# Patient Record
Sex: FEMALE | Race: WHITE | HISPANIC OR LATINO | Employment: STUDENT | URBAN - METROPOLITAN AREA
[De-identification: names, ages, dates, MRNs, and addresses within clinical notes are randomized per-mention and may not be internally consistent; named-entity substitution may affect disease eponyms.]

---
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
## 2024-01-10 NOTE — TELEPHONE ENCOUNTER
----- Message from Carmelina Tafoya MD sent at 2/2/2022  9:08 AM CST -----  Overbook tomorrow at 10 am  ----- Message -----  From: Amaya Espinoza LPN  Sent: 2/2/2022   8:29 AM CST  To: Carmelina Tafoya MD    Please review and advise if able to schedule this patient .. she is 16. So will have to see you...    R  ----- Message -----  From: Kristi Harmon MA  Sent: 2/2/2022   8:28 AM CST  To: Michelet Cosme Staff (Neuro)    Type: Needs Medical Advice  Who Called:  Cara Martin    Best Call Back Number: [phone]  Additional Information: pt was referred to us by Dr Mcmillan with [facility] for chronic daily headaches--would like to know if Dr Tafoya can take her on as a pt since she is under the age of 17? Please advise--thank you             Yes

## 2024-02-05 ENCOUNTER — PATIENT MESSAGE (OUTPATIENT)
Dept: NEUROLOGY | Facility: CLINIC | Age: 19
End: 2024-02-05
Payer: COMMERCIAL

## 2024-02-05 NOTE — LETTER
February 5, 2024    Love Wells  [address] [address]             [place] - Headache  [address] [place] [address]  Phone: [phone]  Fax: [phone] To Whom It May Concern,     Love is under my care, since February 2022, for the treatment of episodic migraines. She will have periodic debilitating episodes of migraines that may require medication that could decrease her mental acuity .     Please feel free to contact my office with any questions or concerns.       Thank you,       Carmelina Tafoya MD

## 2024-02-05 NOTE — LETTER
February 6, 2024    Love Wells  [address] [address]             [place] - Headache  [address] [place] [address]  Phone: [phone]  Fax: [phone] To Whom It May Concern,      Love is under my care, since February 2022, for the treatment of episodic migraines. She will have periodic debilitating episodes of migraines that may require medication that could decrease her ability to function normally.      Please feel free to contact my office with any questions or concerns.         Thank you,         Carmelina Tafoya MD

## 2024-04-12 ENCOUNTER — OFFICE VISIT (OUTPATIENT)
Dept: PEDIATRICS | Facility: CLINIC | Age: 19
End: 2024-04-12
Payer: COMMERCIAL

## 2024-04-12 VITALS
SYSTOLIC BLOOD PRESSURE: 124 MMHG | HEART RATE: 76 BPM | WEIGHT: 201.5 LBS | BODY MASS INDEX: 37.08 KG/M2 | RESPIRATION RATE: 20 BRPM | TEMPERATURE: 99 F | DIASTOLIC BLOOD PRESSURE: 83 MMHG

## 2024-04-12 DIAGNOSIS — J06.9 URI, ACUTE: Primary | ICD-10-CM

## 2024-04-12 PROCEDURE — 3079F DIAST BP 80-89 MM HG: CPT | Mod: CPTII,S$GLB,, | Performed by: PEDIATRICS

## 2024-04-12 PROCEDURE — 1159F MED LIST DOCD IN RCRD: CPT | Mod: CPTII,S$GLB,, | Performed by: PEDIATRICS

## 2024-04-12 PROCEDURE — 3074F SYST BP LT 130 MM HG: CPT | Mod: CPTII,S$GLB,, | Performed by: PEDIATRICS

## 2024-04-12 PROCEDURE — 99999 PR PBB SHADOW E&M-EST. PATIENT-LVL III: CPT | Mod: PBBFAC,,, | Performed by: PEDIATRICS

## 2024-04-12 PROCEDURE — 3008F BODY MASS INDEX DOCD: CPT | Mod: CPTII,S$GLB,, | Performed by: PEDIATRICS

## 2024-04-12 PROCEDURE — 1160F RVW MEDS BY RX/DR IN RCRD: CPT | Mod: CPTII,S$GLB,, | Performed by: PEDIATRICS

## 2024-04-12 PROCEDURE — 99213 OFFICE O/P EST LOW 20 MIN: CPT | Mod: S$GLB,,, | Performed by: PEDIATRICS

## 2024-04-12 RX ORDER — BROMPHENIRAMINE MALEATE, PSEUDOEPHEDRINE HYDROCHLORIDE, AND DEXTROMETHORPHAN HYDROBROMIDE 2; 30; 10 MG/5ML; MG/5ML; MG/5ML
10 SYRUP ORAL EVERY 8 HOURS PRN
Qty: 240 ML | Refills: 0 | Status: SHIPPED | OUTPATIENT
Start: 2024-04-12 | End: 2024-04-17

## 2024-04-12 NOTE — PROGRESS NOTES
Subjective     Love Wells is a 18 y.o. female here with mother. Patient brought in for Nasal Congestion (The pt has nasal congestion for a few days now. It started on Tuesday.)      History of Present Illness:  Cough  This is a new problem. The current episode started in the past 7 days (4 days ago). The problem has been unchanged. The problem occurs constantly. The cough is Non-productive. Associated symptoms include nasal congestion, rhinorrhea and a sore throat. Pertinent negatives include no chest pain, chills, ear congestion, ear pain, eye redness, fever, headaches, postnasal drip, rash, shortness of breath or wheezing. Nothing aggravates the symptoms. She has tried OTC cough suppressant for the symptoms. The treatment provided mild relief. There is no history of bronchitis.       Review of Systems   Constitutional:  Negative for activity change, appetite change, chills, fatigue and fever.   HENT:  Positive for rhinorrhea and sore throat. Negative for congestion, ear discharge, ear pain, nosebleeds, postnasal drip, sinus pressure and sneezing.    Eyes:  Negative for pain, discharge, redness and itching.   Respiratory:  Positive for cough. Negative for shortness of breath and wheezing.    Cardiovascular:  Negative for chest pain and palpitations.   Gastrointestinal:  Negative for abdominal pain, constipation, diarrhea and vomiting.   Genitourinary:  Negative for dysuria, enuresis, hematuria and urgency.   Musculoskeletal:  Negative for neck stiffness.   Skin:  Negative for rash.   Neurological:  Negative for syncope and headaches.          Objective     Physical Exam  Vitals and nursing note reviewed. Exam conducted with a chaperone present.   Constitutional:       Appearance: She is well-developed and normal weight.   HENT:      Head: Normocephalic and atraumatic.      Right Ear: Tympanic membrane, ear canal and external ear normal.      Left Ear: Tympanic membrane, ear canal and external ear normal.       Nose: Mucosal edema and rhinorrhea (clear rhinorrhea) present.      Mouth/Throat:      Pharynx: Posterior oropharyngeal erythema (mild) present. No oropharyngeal exudate.   Eyes:      Conjunctiva/sclera: Conjunctivae normal.      Pupils: Pupils are equal, round, and reactive to light.   Cardiovascular:      Rate and Rhythm: Normal rate and regular rhythm.      Heart sounds: Normal heart sounds. No murmur heard.  Pulmonary:      Effort: Pulmonary effort is normal. No respiratory distress.      Breath sounds: Normal breath sounds.   Abdominal:      General: Bowel sounds are normal. There is no distension.      Palpations: There is no mass.      Tenderness: There is no abdominal tenderness.   Musculoskeletal:      Cervical back: Normal range of motion and neck supple.   Skin:     General: Skin is warm.      Findings: No rash.   Neurological:      Mental Status: She is alert and oriented to person, place, and time.            Assessment and Plan     1. URI, acute        Plan:    Love was seen today for nasal congestion.    Diagnoses and all orders for this visit:    URI, acute    Other orders  -     brompheniramine-pseudoeph-DM (BROMFED DM) 2-30-10 mg/5 mL Syrp; Take 10 mLs by mouth every 8 (eight) hours as needed (cough/congestion).      1.  Nasal saline spray as needed  for congestion.  2.  Encourage frequent oral fluids.  3.  Return to clinic if lethargy, breathing difficulty, worsening headache/pain, signs of dehydration or if any other acute concerns, but if after hours, call the service or seek evaluation at the Emergency Room.  4.  Return to clinic or call if continued symptoms for 5 days.

## 2024-05-13 ENCOUNTER — OFFICE VISIT (OUTPATIENT)
Dept: NEUROLOGY | Facility: CLINIC | Age: 19
End: 2024-05-13
Payer: COMMERCIAL

## 2024-05-13 VITALS
SYSTOLIC BLOOD PRESSURE: 124 MMHG | RESPIRATION RATE: 17 BRPM | HEIGHT: 62 IN | TEMPERATURE: 98 F | DIASTOLIC BLOOD PRESSURE: 80 MMHG | WEIGHT: 204.13 LBS | HEART RATE: 76 BPM | BODY MASS INDEX: 37.56 KG/M2

## 2024-05-13 DIAGNOSIS — Q79.8 CONNECTIVE TISSUE ANOMALY: ICD-10-CM

## 2024-05-13 DIAGNOSIS — G43.019 INTRACTABLE MIGRAINE WITHOUT AURA AND WITHOUT STATUS MIGRAINOSUS: Primary | ICD-10-CM

## 2024-05-13 PROCEDURE — 1159F MED LIST DOCD IN RCRD: CPT | Mod: CPTII,S$GLB,, | Performed by: PSYCHIATRY & NEUROLOGY

## 2024-05-13 PROCEDURE — 3079F DIAST BP 80-89 MM HG: CPT | Mod: CPTII,S$GLB,, | Performed by: PSYCHIATRY & NEUROLOGY

## 2024-05-13 PROCEDURE — 3008F BODY MASS INDEX DOCD: CPT | Mod: CPTII,S$GLB,, | Performed by: PSYCHIATRY & NEUROLOGY

## 2024-05-13 PROCEDURE — 99999 PR PBB SHADOW E&M-EST. PATIENT-LVL III: CPT | Mod: PBBFAC,,, | Performed by: PSYCHIATRY & NEUROLOGY

## 2024-05-13 PROCEDURE — 3074F SYST BP LT 130 MM HG: CPT | Mod: CPTII,S$GLB,, | Performed by: PSYCHIATRY & NEUROLOGY

## 2024-05-13 PROCEDURE — 99214 OFFICE O/P EST MOD 30 MIN: CPT | Mod: S$GLB,,, | Performed by: PSYCHIATRY & NEUROLOGY

## 2024-05-13 NOTE — PROGRESS NOTES
Subjective:       Patient ID: Love Wells is a 18 y.o. female.    Chief Complaint: Headache  INTERVAL HISTORY 05/13/2024  Love presents for follow up. She is doing well. She reports 3 to 4 headaches per month, she is pain free with medication in about 30 minutes. Very pleased with current protocol including Qulipta for prevention and Axert for acute attacks. No change in habitual headche pattern. No red flags.      HPI  Love is a 18 year old female here for follow up regarding migraines which were diagnosed 2 years ago. Since her diagnosis she has been placed on quilepta which she takes everyday and Tylenol as needed. Over the past two weeks she has had three headaches which she attirbutes to exam induced stress. Overall, however she has been doing well. She states that during the duration of her semester she has had only three episodes of severe headaches. She describes the headaches as pounding, near her right eye. She does endorse photophobia and phonophobia for the duration of her headaches, and reports dizziness prior to the onset. She has no other concerns and states that even when the headaches come on, they are not too severe, usually 5/10 and are relieved with Tylenol within 30 minutes. Over the past month she has not taken Tylenol for her migraine but does have a hypermobile right shoulder for which she does have to take Tylenol for pain relief at least once a month       Quilepta (everyday) --> 1 headache a week sometiems 0  Tylenol an hour too --> twice a month    Review of Systems   Constitutional:  Negative for appetite change, chills, diaphoresis and fatigue.   HENT:  Positive for congestion.    Eyes:  Negative for discharge and itching.   Respiratory:  Negative for chest tightness and shortness of breath.    Cardiovascular:  Negative for chest pain, palpitations and leg swelling.   Endocrine: Negative for polyuria.   Genitourinary:  Negative for decreased urine volume, difficulty  urinating, dysuria and vaginal bleeding.   Musculoskeletal:  Positive for arthralgias. Negative for back pain and gait problem.   Skin:  Negative for color change.   Allergic/Immunologic: Positive for environmental allergies.   Neurological:  Positive for dizziness and headaches.             Past Medical History:   Diagnosis Date    Headache      History reviewed. No pertinent surgical history.  Family History   Problem Relation Name Age of Onset    Allergies Mother      Allergic rhinitis Mother      Diabetes Father      Hypertension Father      No Known Problems Sister      Heart disease Maternal Grandfather      No Known Problems Sister      No Known Problems Sister      Angioedema Neg Hx      Asthma Neg Hx      Eczema Neg Hx      Immunodeficiency Neg Hx      Rhinitis Neg Hx      Urticaria Neg Hx       Social History     Socioeconomic History    Marital status: Single   Tobacco Use    Smoking status: Never    Smokeless tobacco: Never     Social Determinants of Health     Financial Resource Strain: Low Risk  (5/13/2024)    Overall Financial Resource Strain (CARDIA)     Difficulty of Paying Living Expenses: Not hard at all   Food Insecurity: No Food Insecurity (5/13/2024)    Hunger Vital Sign     Worried About Running Out of Food in the Last Year: Never true     Ran Out of Food in the Last Year: Never true   Transportation Needs: No Transportation Needs (5/13/2024)    PRAPARE - Transportation     Lack of Transportation (Medical): No     Lack of Transportation (Non-Medical): No   Physical Activity: Insufficiently Active (5/13/2024)    Exercise Vital Sign     Days of Exercise per Week: 2 days     Minutes of Exercise per Session: 20 min   Stress: Stress Concern Present (5/13/2024)    [employer] - Occupational Stress Questionnaire     Feeling of Stress : To some extent   Housing Stability: Unknown (5/13/2024)    Housing Stability Vital Sign     Unable to Pay for Housing in the Last Year: No      Review of patient's allergies indicates:   Allergen Reactions    Imitrex [sumatriptan] Swelling     Throat swelling per pt     Current Outpatient Medications   Medication Sig    acetaminophen (TYLENOL) 500 MG tablet Take 500 mg by mouth every 6 (six) hours as needed for Pain.    atogepant (QULIPTA) 30 mg Tab Take 30 mg by mouth once daily.    erenumab-aooe (AIMOVIG AUTOINJECTOR) 70 mg/mL autoinjector Inject 1 mL (70 mg total) into the skin every 28 days.    olopatadine (PATADAY) 0.2 % Drop Place 1 drop into both eyes once daily. 90 day supply    almotriptan (AXERT) 12.5 MG tablet Take 1 tablet (12.5 mg total) by mouth as needed for Migraine. may repeat in 2 hours if needed     No current facility-administered medications for this visit.         Objective:      Vitals:    05/13/24 1032   BP: 124/80   Pulse: 76   Resp: 17   Temp: 97.8 °F (36.6 °C)     Body mass index is 37.57 kg/m².    Neurological Exam:  General: well-developed, well-nourished, no distress  Mental status: Awake and alert  Speech language: No dysarthria or aphasia on conversation  Cranial nerves: Face symmetric  Motor: Moves all extremities well  Coordination: No ataxia. No tremor.     Review of Data:  Lab Results   Component Value Date     06/15/2022    K 4.5 06/15/2022     06/15/2022    CO2 24 06/15/2022    BUN 14 06/15/2022    CREATININE 0.8 06/15/2022    GLU 80 06/15/2022    AST 16 06/15/2022    ALT 18 06/15/2022    ALBUMIN 4.2 06/15/2022    PROT 7.9 06/15/2022    BILITOT 0.3 06/15/2022    CHOL 166 06/15/2022    HDL 46 06/15/2022    LDLCALC 96.2 06/15/2022    TRIG 119 06/15/2022       Lab Results   Component Value Date    WBC 6.14 06/07/2023    HGB 12.8 06/07/2023    HCT 41.7 06/07/2023    MCV 80 (L) 06/07/2023     06/07/2023       Lab Results   Component Value Date    TSH 3.452 06/15/2022     No results found for this or any previous visit.            Assessment and Plan   There are no diagnoses linked to this  encounter.  Migraines  Continue current treatment regimen with Quilipta and Tylenol PRN; Not needed to take Axert  Continue to monitor any adverse effects and/or headache progression  Follow up in 11 months

## 2024-05-22 ENCOUNTER — PATIENT MESSAGE (OUTPATIENT)
Dept: INTERNAL MEDICINE | Facility: CLINIC | Age: 19
End: 2024-05-22
Payer: COMMERCIAL

## 2024-06-14 ENCOUNTER — OFFICE VISIT (OUTPATIENT)
Dept: PEDIATRICS | Facility: CLINIC | Age: 19
End: 2024-06-14
Payer: COMMERCIAL

## 2024-06-14 VITALS
RESPIRATION RATE: 20 BRPM | WEIGHT: 207 LBS | TEMPERATURE: 98 F | SYSTOLIC BLOOD PRESSURE: 122 MMHG | HEIGHT: 62 IN | BODY MASS INDEX: 38.09 KG/M2 | DIASTOLIC BLOOD PRESSURE: 80 MMHG | HEART RATE: 72 BPM

## 2024-06-14 DIAGNOSIS — Z00.00 ENCOUNTER FOR WELL ADULT EXAM WITHOUT ABNORMAL FINDINGS: Primary | ICD-10-CM

## 2024-06-14 PROCEDURE — 99999 PR PBB SHADOW E&M-EST. PATIENT-LVL IV: CPT | Mod: PBBFAC,,, | Performed by: PEDIATRICS

## 2024-06-14 NOTE — PATIENT INSTRUCTIONS
If you have an active [email or web address] account, please look for your well child questionnaire to come to your [email or web address] account before your next well child visit.  Children younger than 13 must be in the rear seat of a vehicle when available and properly restrained.

## 2024-06-14 NOTE — PROGRESS NOTES
Subjective     Love Wells is a 18 y.o. female here with mother. Patient brought in for Well Child (18 years )      History of Present Illness:  SUBJECTIVE:   Love Wells is a 18 y.o. female presenting for well adolescent and school/sports physical. She is seen today accompanied by mother.  In college at [employer].    PMH: No asthma, diabetes, heart disease, epilepsy or orthopedic problems in the past.        Review of Systems   Constitutional:  Negative for activity change, appetite change, fever and unexpected weight change.   HENT:  Negative for congestion, dental problem, ear pain, hearing loss, rhinorrhea, sinus pressure, sneezing and sore throat.    Eyes:  Negative for redness, itching and visual disturbance.   Respiratory:  Negative for cough, shortness of breath and wheezing.    Cardiovascular:  Negative for chest pain and palpitations.   Gastrointestinal:  Negative for abdominal pain, diarrhea, nausea and vomiting.   Genitourinary:  Negative for dysuria, frequency, hematuria, menstrual problem, vaginal bleeding, vaginal discharge and vaginal pain.   Musculoskeletal:  Negative for arthralgias and myalgias.   Skin:  Negative for rash.   Neurological:  Negative for dizziness, syncope, light-headedness and headaches.   Hematological:  Negative for adenopathy. Does not bruise/bleed easily.   Psychiatric/Behavioral:  Negative for behavioral problems and sleep disturbance. The patient is not nervous/anxious.           Objective     Physical Exam  Vitals and nursing note reviewed. Exam conducted with a chaperone present.   Constitutional:       General: She is not in acute distress.     Appearance: Normal appearance. She is well-developed. She is obese.   HENT:      Head: Normocephalic and atraumatic.      Right Ear: Tympanic membrane and external ear normal.      Left Ear: Tympanic membrane and external ear normal.      Nose: Nose normal.      Mouth/Throat:      Mouth: Mucous membranes are  "moist.      Pharynx: No oropharyngeal exudate.   Eyes:      Conjunctiva/sclera: Conjunctivae normal.      Pupils: Pupils are equal, round, and reactive to light.   Cardiovascular:      Rate and Rhythm: Normal rate and regular rhythm.      Heart sounds: Normal heart sounds. No murmur heard.  Pulmonary:      Effort: Pulmonary effort is normal. No respiratory distress.      Breath sounds: No wheezing.   Abdominal:      General: Bowel sounds are normal. There is no distension.      Palpations: Abdomen is soft. There is no mass.      Tenderness: There is no abdominal tenderness. There is no guarding or rebound.   Musculoskeletal:         General: Normal range of motion.      Cervical back: Normal range of motion and neck supple.   Lymphadenopathy:      Cervical: No cervical adenopathy.   Skin:     General: Skin is warm and dry.      Capillary Refill: Capillary refill takes less than 2 seconds.   Neurological:      Mental Status: She is alert and oriented to person, place, and time.      Deep Tendon Reflexes: Reflexes are normal and symmetric.            Assessment and Plan     1. Encounter for well adult exam without abnormal findings    2. BMI 38.0-38.9,adult        Plan:    Love Wells" was seen today for well child.    Diagnoses and all orders for this visit:    Encounter for well adult exam without abnormal findings    BMI 38.0-38.9,adult      Age appropriate physical activity and nutritional counseling were completed during today's visit.         "

## 2024-07-29 ENCOUNTER — PATIENT MESSAGE (OUTPATIENT)
Dept: INTERNAL MEDICINE | Facility: CLINIC | Age: 19
End: 2024-07-29
Payer: COMMERCIAL

## 2024-10-10 ENCOUNTER — OFFICE VISIT (OUTPATIENT)
Dept: PEDIATRICS | Facility: CLINIC | Age: 19
End: 2024-10-10
Payer: COMMERCIAL

## 2024-10-10 ENCOUNTER — HOSPITAL ENCOUNTER (OUTPATIENT)
Dept: RADIOLOGY | Facility: HOSPITAL | Age: 19
Discharge: HOME | End: 2024-10-10
Attending: PEDIATRICS
Payer: COMMERCIAL

## 2024-10-10 ENCOUNTER — PATIENT MESSAGE (OUTPATIENT)
Dept: PEDIATRICS | Facility: CLINIC | Age: 19
End: 2024-10-10

## 2024-10-10 VITALS
TEMPERATURE: 99 F | BODY MASS INDEX: 37.2 KG/M2 | RESPIRATION RATE: 20 BRPM | DIASTOLIC BLOOD PRESSURE: 73 MMHG | SYSTOLIC BLOOD PRESSURE: 109 MMHG | HEART RATE: 79 BPM | WEIGHT: 200.81 LBS

## 2024-10-10 DIAGNOSIS — M54.2 NECK PAIN ON LEFT SIDE: ICD-10-CM

## 2024-10-10 DIAGNOSIS — M25.512 ACUTE PAIN OF LEFT SHOULDER: Primary | ICD-10-CM

## 2024-10-10 DIAGNOSIS — M25.512 ACUTE PAIN OF LEFT SHOULDER: ICD-10-CM

## 2024-10-10 PROCEDURE — 72040 X-RAY EXAM NECK SPINE 2-3 VW: CPT | Mod: 26,,, | Performed by: RADIOLOGY

## 2024-10-10 PROCEDURE — G2211 COMPLEX E/M VISIT ADD ON: HCPCS | Mod: S$GLB,,, | Performed by: PEDIATRICS

## 2024-10-10 PROCEDURE — 1159F MED LIST DOCD IN RCRD: CPT | Mod: CPTII,S$GLB,, | Performed by: PEDIATRICS

## 2024-10-10 PROCEDURE — 99999 PR PBB SHADOW E&M-EST. PATIENT-LVL III: CPT | Mod: PBBFAC,,, | Performed by: PEDIATRICS

## 2024-10-10 PROCEDURE — 3008F BODY MASS INDEX DOCD: CPT | Mod: CPTII,S$GLB,, | Performed by: PEDIATRICS

## 2024-10-10 PROCEDURE — 72070 X-RAY EXAM THORAC SPINE 2VWS: CPT | Mod: TC,PN

## 2024-10-10 PROCEDURE — 3074F SYST BP LT 130 MM HG: CPT | Mod: CPTII,S$GLB,, | Performed by: PEDIATRICS

## 2024-10-10 PROCEDURE — 72070 X-RAY EXAM THORAC SPINE 2VWS: CPT | Mod: 26,,, | Performed by: RADIOLOGY

## 2024-10-10 PROCEDURE — 1160F RVW MEDS BY RX/DR IN RCRD: CPT | Mod: CPTII,S$GLB,, | Performed by: PEDIATRICS

## 2024-10-10 PROCEDURE — 99214 OFFICE O/P EST MOD 30 MIN: CPT | Mod: S$GLB,,, | Performed by: PEDIATRICS

## 2024-10-10 PROCEDURE — 72040 X-RAY EXAM NECK SPINE 2-3 VW: CPT | Mod: TC,PN

## 2024-10-10 PROCEDURE — 3078F DIAST BP <80 MM HG: CPT | Mod: CPTII,S$GLB,, | Performed by: PEDIATRICS

## 2024-10-10 RX ORDER — NAPROXEN 500 MG/1
500 TABLET ORAL 2 TIMES DAILY PRN
Qty: 20 TABLET | Refills: 0 | Status: SHIPPED | OUTPATIENT
Start: 2024-10-10

## 2024-10-10 RX ORDER — CYCLOBENZAPRINE HCL 5 MG
5 TABLET ORAL 3 TIMES DAILY PRN
Qty: 20 TABLET | Refills: 0 | Status: SHIPPED | OUTPATIENT
Start: 2024-10-10 | End: 2024-10-20

## 2024-10-10 NOTE — PROGRESS NOTES
Subjective     Love Wells is a 19 y.o. female here with mother. Patient brought in for Shoulder Pain (Since July 2024, per pt, pt concerned may have pulled muscle, pain worsens while playing Tennis, pain comes and goes )      History of Present Illness:  Pt with left shoulder, neck injury several months ago after trying to lift a trash can that had water in it in the ditch.  She had improvement but now worse again after starting tennis this semester.  Pain in left neck, shoulder and forearm with finger tingling at times.         Review of Systems   Constitutional:  Negative for activity change, appetite change, chills, fatigue and fever.   HENT:  Negative for congestion, ear discharge, ear pain, nosebleeds, postnasal drip, rhinorrhea, sinus pressure, sneezing and sore throat.    Eyes:  Negative for pain, discharge, redness and itching.   Respiratory:  Negative for cough, shortness of breath and wheezing.    Cardiovascular:  Negative for chest pain and palpitations.   Gastrointestinal:  Negative for abdominal pain, constipation, diarrhea and vomiting.   Genitourinary:  Negative for dysuria, enuresis, hematuria and urgency.   Musculoskeletal:  Positive for arthralgias and back pain. Negative for neck stiffness.   Skin:  Negative for rash.   Neurological:  Negative for syncope and headaches.          Objective     Physical Exam  Vitals and nursing note reviewed. Exam conducted with a chaperone present.   Constitutional:       General: She is not in acute distress.     Appearance: Normal appearance. She is well-developed. She is not toxic-appearing.   HENT:      Head: Normocephalic and atraumatic.      Right Ear: Tympanic membrane, ear canal and external ear normal.      Left Ear: Tympanic membrane, ear canal and external ear normal.      Nose: Nose normal. No congestion or rhinorrhea.      Mouth/Throat:      Mouth: Mucous membranes are moist.   Eyes:      Conjunctiva/sclera: Conjunctivae normal.      Pupils:  "Pupils are equal, round, and reactive to light.   Cardiovascular:      Rate and Rhythm: Normal rate and regular rhythm.      Heart sounds: Normal heart sounds. No murmur heard.  Pulmonary:      Effort: Pulmonary effort is normal. No respiratory distress.      Breath sounds: Normal breath sounds.   Abdominal:      General: Bowel sounds are normal. There is no distension.      Palpations: There is no mass.      Tenderness: There is no abdominal tenderness.   Musculoskeletal:         General: Tenderness (to left trapezius, anterior shoulder. left neck) present.      Cervical back: Normal range of motion and neck supple.   Skin:     General: Skin is warm.      Findings: No rash.   Neurological:      Mental Status: She is alert and oriented to person, place, and time.       No bony lesions on xray     Assessment and Plan     1. Acute pain of left shoulder    2. Neck pain on left side        Plan:    Love Wells" was seen today for shoulder pain.    Diagnoses and all orders for this visit:    Acute pain of left shoulder  -     X-Ray Cervical Spine AP And Lateral; Future  -     X-Ray Thoracic Spine AP Lateral; Future  -     naproxen (NAPROSYN) 500 MG tablet; Take 1 tablet (500 mg total) by mouth 2 (two) times daily as needed (neck pain).  -     cyclobenzaprine (FLEXERIL) 5 MG tablet; Take 1 tablet (5 mg total) by mouth 3 (three) times daily as needed for Muscle spasms.    Neck pain on left side  -     X-Ray Cervical Spine AP And Lateral; Future  -     X-Ray Thoracic Spine AP Lateral; Future      Consider PT if not improving over the next week.        "

## 2024-11-11 RX ORDER — ATOGEPANT 30 MG/1
30 TABLET ORAL DAILY
Qty: 30 TABLET | Refills: 11 | Status: SHIPPED | OUTPATIENT
Start: 2024-11-11

## 2024-11-12 ENCOUNTER — TELEPHONE (OUTPATIENT)
Dept: NEUROLOGY | Facility: CLINIC | Age: 19
End: 2024-11-12
Payer: COMMERCIAL

## 2024-11-12 NOTE — TELEPHONE ENCOUNTER
The prior authorization for Love Wells's Qulipta prescription has been APPROVED FROM 11/12/24 TO 11/12/25 with copayment of $0.00.

## 2025-03-31 ENCOUNTER — LAB VISIT (OUTPATIENT)
Dept: LAB | Facility: HOSPITAL | Age: 20
End: 2025-03-31
Attending: PEDIATRICS
Payer: COMMERCIAL

## 2025-03-31 ENCOUNTER — OFFICE VISIT (OUTPATIENT)
Dept: ALLERGY | Facility: CLINIC | Age: 20
End: 2025-03-31
Payer: COMMERCIAL

## 2025-03-31 VITALS — HEIGHT: 61 IN | BODY MASS INDEX: 38.59 KG/M2 | WEIGHT: 204.38 LBS

## 2025-03-31 DIAGNOSIS — J31.0 CHRONIC RHINITIS: ICD-10-CM

## 2025-03-31 DIAGNOSIS — H10.423 SIMPLE CHRONIC CONJUNCTIVITIS OF BOTH EYES: ICD-10-CM

## 2025-03-31 DIAGNOSIS — J31.0 CHRONIC RHINITIS: Primary | ICD-10-CM

## 2025-03-31 LAB
HOLD SPECIMEN: NORMAL

## 2025-03-31 PROCEDURE — 86003 ALLG SPEC IGE CRUDE XTRC EA: CPT | Mod: 59

## 2025-03-31 PROCEDURE — 1160F RVW MEDS BY RX/DR IN RCRD: CPT | Mod: CPTII,S$GLB,, | Performed by: ALLERGY & IMMUNOLOGY

## 2025-03-31 PROCEDURE — 99999 PR PBB SHADOW E&M-EST. PATIENT-LVL III: CPT | Mod: PBBFAC,,, | Performed by: ALLERGY & IMMUNOLOGY

## 2025-03-31 PROCEDURE — 86003 ALLG SPEC IGE CRUDE XTRC EA: CPT

## 2025-03-31 PROCEDURE — 3008F BODY MASS INDEX DOCD: CPT | Mod: CPTII,S$GLB,, | Performed by: ALLERGY & IMMUNOLOGY

## 2025-03-31 PROCEDURE — 36415 COLL VENOUS BLD VENIPUNCTURE: CPT | Mod: PO

## 2025-03-31 PROCEDURE — 99204 OFFICE O/P NEW MOD 45 MIN: CPT | Mod: S$GLB,,, | Performed by: ALLERGY & IMMUNOLOGY

## 2025-03-31 PROCEDURE — 1159F MED LIST DOCD IN RCRD: CPT | Mod: CPTII,S$GLB,, | Performed by: ALLERGY & IMMUNOLOGY

## 2025-03-31 NOTE — PROGRESS NOTES
Subjective:       Patient ID: Love Wells is a 19 y.o. female.    Chief Complaint:  Allergies (Rhinitis itchy eyes throat and nose sneezing)      18 yo woman presents for new patient evaluation of possible allergies. She states she has itchy watery eyes, itchy throat, congestion, runny nose, PND causing sore throat, itchy ears and when bad chest tight and wheeze and SOB. Has on and off all year but bad when pollen high. No time of day worse. She does get hives around cats and dogs, she avoids cats. Is worse outside. Takes zyrtec prn, used to have eye drops but felt they dropped down too much. No H/O asthma. Has eczema. No known food, insect or latex allergy. Never had allergy test. She does have migraines and joint hypermobility. No there medical issues. No ENT surgery        Environmental History: see history section for home environment  Review of Systems   Constitutional:  Negative for chills and fever.   HENT:  Positive for congestion, ear pain, postnasal drip, rhinorrhea, sinus pressure, sneezing and sore throat. Negative for facial swelling, mouth sores and nosebleeds.    Eyes:  Positive for discharge, redness and itching.   Respiratory:  Positive for chest tightness, shortness of breath and wheezing. Negative for cough.    Skin:  Negative for color change and rash.        Objective:      Physical Exam  Vitals and nursing note reviewed.   Constitutional:       General: She is not in acute distress.     Appearance: Normal appearance. She is not ill-appearing.   HENT:      Nose: No rhinorrhea.   Eyes:      General:         Right eye: No discharge.         Left eye: No discharge.      Conjunctiva/sclera: Conjunctivae normal.   Pulmonary:      Effort: Pulmonary effort is normal. No respiratory distress.   Abdominal:      General: There is no distension.   Skin:     General: Skin is warm and dry.      Findings: No erythema or rash.   Neurological:      Mental Status: She is alert and oriented to person,  place, and time.   Psychiatric:         Mood and Affect: Mood normal.         Behavior: Behavior normal.         Laboratory:   none performed   Assessment:       1. Chronic rhinitis    2. Simple chronic conjunctivitis of both eyes         Plan:       Advised symptoms can be allergic vs chronic non allergic, will send immunocaps to assess  Continue zyrtec daily, may need to add azelastine after results  If wheeze continues may need PFT  Phone review    I spent a total of 45 minutes on the day of the visit.  This includes face to face time and non-face to face time preparing to see the patient (eg, review of tests), obtaining and/or reviewing separately obtained history, documenting clinical information in the electronic or other health record, independently interpreting results and communicating results to the patient/family/caregiver, or care coordinator.

## 2025-03-31 NOTE — LETTER
March 31, 2025      [facility]  [address]  [place] [address]  Phone: [phone]       Patient: Love Wells   YOB: 2005  Date of Visit: 03/31/2025    To Whom It May Concern:    Love Wells  was at [facility] on 03/31/2025    Sincerely,    Jamel Dc LPN

## 2025-04-10 ENCOUNTER — PATIENT MESSAGE (OUTPATIENT)
Dept: ALLERGY | Facility: CLINIC | Age: 20
End: 2025-04-10
Payer: COMMERCIAL

## 2025-04-22 ENCOUNTER — OFFICE VISIT (OUTPATIENT)
Dept: NEUROLOGY | Facility: CLINIC | Age: 20
End: 2025-04-22
Payer: COMMERCIAL

## 2025-04-22 VITALS
HEIGHT: 61 IN | DIASTOLIC BLOOD PRESSURE: 76 MMHG | TEMPERATURE: 97 F | SYSTOLIC BLOOD PRESSURE: 112 MMHG | BODY MASS INDEX: 38.53 KG/M2 | HEART RATE: 93 BPM | RESPIRATION RATE: 17 BRPM | WEIGHT: 204.06 LBS

## 2025-04-22 DIAGNOSIS — G43.019 INTRACTABLE MIGRAINE WITHOUT AURA AND WITHOUT STATUS MIGRAINOSUS: Primary | ICD-10-CM

## 2025-04-22 DIAGNOSIS — Q79.8 CONNECTIVE TISSUE ANOMALY: ICD-10-CM

## 2025-04-22 PROCEDURE — 1159F MED LIST DOCD IN RCRD: CPT | Mod: CPTII,S$GLB,, | Performed by: PSYCHIATRY & NEUROLOGY

## 2025-04-22 PROCEDURE — 99999 PR PBB SHADOW E&M-EST. PATIENT-LVL III: CPT | Mod: PBBFAC,,, | Performed by: PSYCHIATRY & NEUROLOGY

## 2025-04-22 PROCEDURE — 3078F DIAST BP <80 MM HG: CPT | Mod: CPTII,S$GLB,, | Performed by: PSYCHIATRY & NEUROLOGY

## 2025-04-22 PROCEDURE — 3074F SYST BP LT 130 MM HG: CPT | Mod: CPTII,S$GLB,, | Performed by: PSYCHIATRY & NEUROLOGY

## 2025-04-22 PROCEDURE — 99214 OFFICE O/P EST MOD 30 MIN: CPT | Mod: S$GLB,,, | Performed by: PSYCHIATRY & NEUROLOGY

## 2025-04-22 PROCEDURE — 3008F BODY MASS INDEX DOCD: CPT | Mod: CPTII,S$GLB,, | Performed by: PSYCHIATRY & NEUROLOGY

## 2025-04-22 NOTE — PROGRESS NOTES
Subjective:       Patient ID: Love Wells is a 19 y.o. female.    Chief Complaint: Headache    INTERVAL HISTORY 04/22/2025  Love comes for follow up. She continues to do well on Qulipta 60 mg po daily. She uses Tylenol for acute escalations, she takes it rarely. Most recent headache related to college work. Over, please with current regimen. No change in habitual headche pattern. No red flags.      INTERVAL HISTORY 05/13/2024  Love presents for follow up. She is doing well. She reports 3 to 4 headaches per month, she is pain free with medication in about 30 minutes. Very pleased with current protocol including Qulipta for prevention and Axert for acute attacks. No change in habitual headche pattern. No red flags.      HPI  Love is a 18 year old female here for follow up regarding migraines which were diagnosed 2 years ago. Since her diagnosis she has been placed on quilepta which she takes everyday and Tylenol as needed. Over the past two weeks she has had three headaches which she attirbutes to exam induced stress. Overall, however she has been doing well. She states that during the duration of her semester she has had only three episodes of severe headaches. She describes the headaches as pounding, near her right eye. She does endorse photophobia and phonophobia for the duration of her headaches, and reports dizziness prior to the onset. She has no other concerns and states that even when the headaches come on, they are not too severe, usually 5/10 and are relieved with Tylenol within 30 minutes. Over the past month she has not taken Tylenol for her migraine but does have a hypermobile right shoulder for which she does have to take Tylenol for pain relief at least once a month       Quilepta (everyday) --> 1 headache a week sometiems 0  Tylenol an hour too --> twice a month    Review of Systems   Constitutional:  Negative for appetite change, chills, diaphoresis and fatigue.   HENT:  Positive for  congestion.    Eyes:  Negative for discharge and itching.   Respiratory:  Negative for chest tightness and shortness of breath.    Cardiovascular:  Negative for chest pain, palpitations and leg swelling.   Endocrine: Negative for polyuria.   Genitourinary:  Negative for decreased urine volume, difficulty urinating, dysuria and vaginal bleeding.   Musculoskeletal:  Positive for arthralgias. Negative for back pain and gait problem.   Skin:  Negative for color change.   Allergic/Immunologic: Positive for environmental allergies.   Neurological:  Positive for dizziness and headaches.             Past Medical History:   Diagnosis Date    Headache      History reviewed. No pertinent surgical history.  Family History   Problem Relation Name Age of Onset    Allergies Mother      Allergic rhinitis Mother      Diabetes Father      Hypertension Father      No Known Problems Sister      Heart disease Maternal Grandfather      No Known Problems Sister      No Known Problems Sister      Angioedema Neg Hx      Asthma Neg Hx      Eczema Neg Hx      Immunodeficiency Neg Hx      Rhinitis Neg Hx      Urticaria Neg Hx       Social History     Socioeconomic History    Marital status: Single   Tobacco Use    Smoking status: Never    Smokeless tobacco: Never     Social Determinants of Health     Financial Resource Strain: Low Risk  (5/13/2024)    Overall Financial Resource Strain (CARDIA)     Difficulty of Paying Living Expenses: Not hard at all   Food Insecurity: No Food Insecurity (5/13/2024)    Hunger Vital Sign     Worried About Running Out of Food in the Last Year: Never true     Ran Out of Food in the Last Year: Never true   Transportation Needs: No Transportation Needs (5/13/2024)    PRAPARE - Transportation     Lack of Transportation (Medical): No     Lack of Transportation (Non-Medical): No   Physical Activity: Insufficiently Active (5/13/2024)    Exercise Vital Sign     Days of Exercise per Week: 2 days     Minutes of Exercise  per Session: 20 min   Stress: Stress Concern Present (5/13/2024)    [employer] - Occupational Stress Questionnaire     Feeling of Stress : To some extent   Housing Stability: Unknown (5/13/2024)    Housing Stability Vital Sign     Unable to Pay for Housing in the Last Year: No     Review of patient's allergies indicates:   Allergen Reactions    Imitrex [sumatriptan] Swelling     Throat swelling per pt     Current Outpatient Medications   Medication Sig    acetaminophen (TYLENOL) 500 MG tablet Take 500 mg by mouth every 6 (six) hours as needed for Pain.    atogepant (QULIPTA) 30 mg Tab Take 30 mg by mouth once daily.    erenumab-aooe (AIMOVIG AUTOINJECTOR) 70 mg/mL autoinjector Inject 1 mL (70 mg total) into the skin every 28 days.    olopatadine (PATADAY) 0.2 % Drop Place 1 drop into both eyes once daily. 90 day supply    almotriptan (AXERT) 12.5 MG tablet Take 1 tablet (12.5 mg total) by mouth as needed for Migraine. may repeat in 2 hours if needed     No current facility-administered medications for this visit.         Objective:      Vitals:    04/22/25 1416   BP: 112/76   Pulse: 93   Resp: 17   Temp: 97.1 °F (36.2 °C)     Body mass index is 38.55 kg/m².      Neurological Exam:  General: well-developed, well-nourished, no distress  Mental status: Awake and alert  Speech language: No dysarthria or aphasia on conversation  Cranial nerves: Face symmetric  Motor: Moves all extremities well  Coordination: No ataxia. No tremor.     Review of Data:  Lab Results   Component Value Date     06/15/2022    K 4.5 06/15/2022     06/15/2022    CO2 24 06/15/2022    BUN 14 06/15/2022    CREATININE 0.8 06/15/2022    GLU 80 06/15/2022    AST 16 06/15/2022    ALT 18 06/15/2022    ALBUMIN 4.2 06/15/2022    PROT 7.9 06/15/2022    BILITOT 0.3 06/15/2022    CHOL 166 06/15/2022    HDL 46 06/15/2022    LDLCALC 96.2 06/15/2022    TRIG 119 06/15/2022       Lab Results   Component Value Date    WBC  6.14 06/07/2023    HGB 12.8 06/07/2023    HCT 41.7 06/07/2023    MCV 80 (L) 06/07/2023     06/07/2023       Lab Results   Component Value Date    TSH 3.452 06/15/2022               Assessment and Plan   There are no diagnoses linked to this encounter. No labs since 2022. Will obtain basic labs including CBC, CMP, and TSH  Migraines  Continue current treatment regimen with Quilipta and Tylenol PRN; Not needed to take Axert  Continue to monitor any adverse effects and/or headache progression  Follow up in 11 months

## 2025-06-18 ENCOUNTER — LAB VISIT (OUTPATIENT)
Dept: LAB | Facility: HOSPITAL | Age: 20
End: 2025-06-18
Attending: EMERGENCY MEDICINE
Payer: COMMERCIAL

## 2025-06-18 ENCOUNTER — OFFICE VISIT (OUTPATIENT)
Dept: FAMILY MEDICINE | Facility: CLINIC | Age: 20
End: 2025-06-18
Payer: COMMERCIAL

## 2025-06-18 VITALS
OXYGEN SATURATION: 98 % | WEIGHT: 203.94 LBS | BODY MASS INDEX: 38.51 KG/M2 | HEART RATE: 81 BPM | SYSTOLIC BLOOD PRESSURE: 110 MMHG | HEIGHT: 61 IN | DIASTOLIC BLOOD PRESSURE: 74 MMHG

## 2025-06-18 DIAGNOSIS — E66.812 CLASS 2 OBESITY DUE TO EXCESS CALORIES WITH BODY MASS INDEX (BMI) OF 38.0 TO 38.9 IN ADULT, UNSPECIFIED WHETHER SERIOUS COMORBIDITY PRESENT: ICD-10-CM

## 2025-06-18 DIAGNOSIS — Z00.00 ENCOUNTER FOR SCREENING AND PREVENTATIVE CARE: Primary | ICD-10-CM

## 2025-06-18 DIAGNOSIS — M25.512 CHRONIC PAIN OF BOTH SHOULDERS: ICD-10-CM

## 2025-06-18 DIAGNOSIS — Z00.00 ENCOUNTER FOR SCREENING AND PREVENTATIVE CARE: ICD-10-CM

## 2025-06-18 DIAGNOSIS — E66.09 CLASS 2 OBESITY DUE TO EXCESS CALORIES WITH BODY MASS INDEX (BMI) OF 38.0 TO 38.9 IN ADULT, UNSPECIFIED WHETHER SERIOUS COMORBIDITY PRESENT: ICD-10-CM

## 2025-06-18 DIAGNOSIS — M25.511 CHRONIC PAIN OF BOTH SHOULDERS: ICD-10-CM

## 2025-06-18 DIAGNOSIS — G89.29 CHRONIC PAIN OF BOTH SHOULDERS: ICD-10-CM

## 2025-06-18 DIAGNOSIS — H10.13 ALLERGIC CONJUNCTIVITIS, BILATERAL: ICD-10-CM

## 2025-06-18 LAB
ABSOLUTE EOSINOPHIL (OHS): 0.37 K/UL
ABSOLUTE MONOCYTE (OHS): 0.58 K/UL (ref 0.3–1)
ABSOLUTE NEUTROPHIL COUNT (OHS): 4.57 K/UL (ref 1.8–7.7)
ALBUMIN SERPL BCP-MCNC: 4.2 G/DL (ref 3.5–5.2)
ALP SERPL-CCNC: 59 UNIT/L (ref 40–150)
ALT SERPL W/O P-5'-P-CCNC: 31 UNIT/L (ref 10–44)
ANION GAP (OHS): 13 MMOL/L (ref 8–16)
AST SERPL-CCNC: 21 UNIT/L (ref 11–45)
BASOPHILS # BLD AUTO: 0.05 K/UL
BASOPHILS NFR BLD AUTO: 0.7 %
BILIRUB SERPL-MCNC: 0.4 MG/DL (ref 0.1–1)
BUN SERPL-MCNC: 13 MG/DL (ref 6–20)
CALCIUM SERPL-MCNC: 9.3 MG/DL (ref 8.7–10.5)
CHLORIDE SERPL-SCNC: 108 MMOL/L (ref 95–110)
CHOLEST SERPL-MCNC: 167 MG/DL (ref 120–199)
CHOLEST/HDLC SERPL: 3.6 {RATIO} (ref 2–5)
CO2 SERPL-SCNC: 21 MMOL/L (ref 23–29)
CREAT SERPL-MCNC: 0.8 MG/DL (ref 0.5–1.4)
EAG (OHS): 105 MG/DL (ref 68–131)
ERYTHROCYTE [DISTWIDTH] IN BLOOD BY AUTOMATED COUNT: 14.8 % (ref 11.5–14.5)
GFR SERPLBLD CREATININE-BSD FMLA CKD-EPI: >60 ML/MIN/1.73/M2
GLUCOSE SERPL-MCNC: 91 MG/DL (ref 70–110)
HBA1C MFR BLD: 5.3 % (ref 4–5.6)
HCT VFR BLD AUTO: 38.7 % (ref 37–48.5)
HCV AB SERPL QL IA: NORMAL
HDLC SERPL-MCNC: 46 MG/DL (ref 40–75)
HDLC SERPL: 27.5 % (ref 20–50)
HGB BLD-MCNC: 11.8 GM/DL (ref 12–16)
HIV 1+2 AB+HIV1 P24 AG SERPL QL IA: NORMAL
IMM GRANULOCYTES # BLD AUTO: 0.02 K/UL (ref 0–0.04)
IMM GRANULOCYTES NFR BLD AUTO: 0.3 % (ref 0–0.5)
LDLC SERPL CALC-MCNC: 104.6 MG/DL (ref 63–159)
LYMPHOCYTES # BLD AUTO: 1.9 K/UL (ref 1–4.8)
MCH RBC QN AUTO: 23.6 PG (ref 27–31)
MCHC RBC AUTO-ENTMCNC: 30.5 G/DL (ref 32–36)
MCV RBC AUTO: 78 FL (ref 82–98)
NONHDLC SERPL-MCNC: 121 MG/DL
NUCLEATED RBC (/100WBC) (OHS): 0 /100 WBC
PLATELET # BLD AUTO: 347 K/UL (ref 150–450)
PMV BLD AUTO: 11.1 FL (ref 9.2–12.9)
POTASSIUM SERPL-SCNC: 3.9 MMOL/L (ref 3.5–5.1)
PROT SERPL-MCNC: 8 GM/DL (ref 6–8.4)
RBC # BLD AUTO: 4.99 M/UL (ref 4–5.4)
RELATIVE EOSINOPHIL (OHS): 4.9 %
RELATIVE LYMPHOCYTE (OHS): 25.4 % (ref 18–48)
RELATIVE MONOCYTE (OHS): 7.7 % (ref 4–15)
RELATIVE NEUTROPHIL (OHS): 61 % (ref 38–73)
SODIUM SERPL-SCNC: 142 MMOL/L (ref 136–145)
TRIGL SERPL-MCNC: 82 MG/DL (ref 30–150)
TSH SERPL-ACNC: 2.19 UIU/ML (ref 0.4–4)
WBC # BLD AUTO: 7.49 K/UL (ref 3.9–12.7)

## 2025-06-18 PROCEDURE — 85025 COMPLETE CBC W/AUTO DIFF WBC: CPT

## 2025-06-18 PROCEDURE — 80053 COMPREHEN METABOLIC PANEL: CPT | Mod: PO

## 2025-06-18 PROCEDURE — 86803 HEPATITIS C AB TEST: CPT

## 2025-06-18 PROCEDURE — 80061 LIPID PANEL: CPT

## 2025-06-18 PROCEDURE — 84443 ASSAY THYROID STIM HORMONE: CPT

## 2025-06-18 PROCEDURE — 99999 PR PBB SHADOW E&M-EST. PATIENT-LVL III: CPT | Mod: PBBFAC,,, | Performed by: EMERGENCY MEDICINE

## 2025-06-18 PROCEDURE — 87389 HIV-1 AG W/HIV-1&-2 AB AG IA: CPT

## 2025-06-18 PROCEDURE — 83036 HEMOGLOBIN GLYCOSYLATED A1C: CPT

## 2025-06-18 PROCEDURE — 36415 COLL VENOUS BLD VENIPUNCTURE: CPT | Mod: PO

## 2025-06-18 RX ORDER — OLOPATADINE HYDROCHLORIDE 2 MG/ML
1 SOLUTION OPHTHALMIC DAILY
Qty: 3 ML | Refills: 1 | Status: SHIPPED | OUTPATIENT
Start: 2025-06-18 | End: 2026-06-18

## 2025-06-18 NOTE — ASSESSMENT & PLAN NOTE
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  She has exercises that she is doing and she is recommended to keep working at strengthening the muscles in her shoulders to help alleviate her chronic pain.

## 2025-06-18 NOTE — PROGRESS NOTES
??NEW PATIENT??  Name: Love Wells  MRN: [id number]  : 2005    Subjective     Subjective   HPI  Love Wells is here today to establish care.    Objective     Review of Systems   Constitutional: Negative.    HENT: Negative.     Eyes: Negative.    Respiratory: Negative.     Cardiovascular: Negative.    Gastrointestinal: Negative.    Endocrine: Negative.    Genitourinary: Negative.    Musculoskeletal: Negative.    Allergic/Immunologic: Negative.    Neurological: Negative.    Hematological: Negative.    Psychiatric/Behavioral: Negative.     All other systems reviewed and are negative.       Problem List[1]    Medications Ordered Prior to Encounter[2]    Past Medical History:   Diagnosis Date    Headache        History reviewed. No pertinent surgical history.     Family History   Problem Relation Name Age of Onset    Allergies Mother      Allergic rhinitis Mother      Diabetes Father      Hypertension Father      No Known Problems Sister      Heart disease Maternal Grandfather      No Known Problems Sister      No Known Problems Sister      Angioedema Neg Hx      Asthma Neg Hx      Eczema Neg Hx      Immunodeficiency Neg Hx      Rhinitis Neg Hx      Urticaria Neg Hx         Social History[3]    Review of patient's allergies indicates:   Allergen Reactions    Imitrex [sumatriptan] Swelling     Throat swelling per pt        Health Maintenance Due   Topic Date Due    Hepatitis C Screening  Never done    HIV Screening  Never done    COVID-19 Vaccine ( season) 2024    TETANUS VACCINE  06/15/2025    Pap Smear  06/15/2025       Objective   Vitals:    25 0851   BP: 110/74   Pulse: 81        Physical Exam  Vitals and nursing note reviewed.   Constitutional:       General: She is not in acute distress.     Appearance: Normal appearance. She is well-developed.   HENT:      Head: Normocephalic and atraumatic.      Right Ear: External ear normal.      Left Ear: External ear normal.       Mouth/Throat:      Mouth: Mucous membranes are moist.   Eyes:      General: Lids are normal. Gaze aligned appropriately.      Extraocular Movements: Extraocular movements intact.      Conjunctiva/sclera: Conjunctivae normal.      Pupils: Pupils are equal, round, and reactive to light.   Cardiovascular:      Rate and Rhythm: Normal rate and regular rhythm.      Heart sounds: No murmur heard.     No friction rub. No gallop.   Pulmonary:      Effort: Pulmonary effort is normal. No respiratory distress.      Breath sounds: Normal breath sounds and air entry. No wheezing, rhonchi or rales.   Abdominal:      General: Abdomen is flat. There is no distension.   Musculoskeletal:         General: No swelling or deformity.      Cervical back: Full passive range of motion without pain, normal range of motion and neck supple.      Right lower leg: No edema.      Left lower leg: No edema.   Skin:     General: Skin is warm and dry.      Coloration: Skin is not jaundiced.   Neurological:      General: No focal deficit present.      Mental Status: She is alert and oriented to person, place, and time. Mental status is at baseline.      GCS: GCS eye subscore is 4. GCS verbal subscore is 5. GCS motor subscore is 6.   Psychiatric:         Attention and Perception: Attention and perception normal.         Mood and Affect: Mood normal.         Speech: Speech normal.         Behavior: Behavior normal. Behavior is cooperative.         Thought Content: Thought content normal.         Cognition and Memory: Cognition normal.         Judgment: Judgment normal.          Screening Tools     ?? Alcohol Use Screening ??    Patient denies Alcohol use [x]    CAGE Questionnaire:  - Have you ever felt you should Cut down on your drinking?  [] Yes   [] No  - Have people Annoyed you by criticizing your drinking?     [] Yes   [] No  - Have you ever felt Guilty about your drinking?            [] Yes   [] No  - Have you ever had a drink first thing in the  morning     to steady your nerves or get rid of a hangover (Eye-opener)? [] Yes   [] No    [] CAGE score >=2: Positive screen for potential alcohol use disorder  [] CAGE score <=2: Negative screen for potential alcohol use disorder    Medical Decision Making     Assessment & Plan  Encounter for screening and preventative care  Patient needs some health maintenance items updating, we discussed them and the patient was agreeable with all orders placed.  We will obtain them and then address any issues.  This was discussed with the patient and they are aware that we will address results via the [facility] and as needed.    Advised that a healthy lifestyle is built on a foundation of balanced nutrition, regular physical activity, sufficient sleep, stress management, and positive social connections. We discussed prioritizing whole, nutrient-dense foods, staying hydrated, engaging in movement daily, and maintaining mental well-being contribute to overall health. Patient advised to avoid harmful habits like smoking, excessive alcohol consumption, and chronic stress.      Orders:    CBC Auto Differential; Future    Comprehensive Metabolic Panel; Future    Hemoglobin A1C; Future    Lipid Panel; Future    TSH; Future    HIV 1/2 Ag/Ab (4th Gen); Future    Hepatitis C Antibody; Future    Ambulatory referral/consult to Obstetrics / Gynecology; Future    Allergic conjunctivitis, bilateral    Orders:    olopatadine (PATADAY) 0.2 % Drop; Place 1 drop into both eyes once daily. 90 day supply    Class 2 obesity due to excess calories with body mass index (BMI) of 38.0 to 38.9 in adult, unspecified whether serious comorbidity present  Patient is aware that their elevated BMI / weight is contributing significantly to their health problems.  We discussed this in detail as well as weight loss strategies / diet / exercise.       Chronic pain of both shoulders  Patient has had physical therapy in the past and is a physical therapy  student.  She has exercises that she is doing and she is recommended to keep working at strengthening the muscles in her shoulders to help alleviate her chronic pain.              Additional Discussion     ?? Vaccine discussion ??    Patient declined the following vaccines today after discussion of risk / benefits:     [] Tetanus  [] Pneumococcal  [x] COVID 19  [] Respiratory Syncytial Virus  [] Shingles / Zoster    Patient will go to the Pharmacy for the following vaccines, we discussed risks / benefits:    [x] Tetanus  [] Pneumococcal  [] COVID 19  [] Respiratory Syncytial Virus  [] Shingles / Zoster    Continuity     Follow up  6 months    Patient is encouraged to contact me at anytime via [facility] or my office for any needs.    Thony Tafoya MD  Primary Care ??  06/18/2025     DISCLAIMER: This note was prepared with [employer] Direct voice recognition transcription software. Garbled syntax, mangled pronouns, and other bizarre constructions may be attributed to that software system.  I attest to having reviewed and edited the generated note for accuracy, though some syntax or spelling errors may persist. Please contact the author of this note for any clarification.         [1]   Patient Active Problem List  Diagnosis    Posture abnormality    Shoulder weakness    Intractable migraine without aura and without status migrainosus    Chronic pain of both shoulders    Connective tissue anomaly   [2]   Current Outpatient Medications on File Prior to Visit   Medication Sig Dispense Refill    acetaminophen (TYLENOL) 500 MG tablet Take 500 mg by mouth every 6 (six) hours as needed for Pain.      atogepant (QULIPTA) 30 mg Tab Take 1 tablet by mouth once daily. 30 tablet 11    [DISCONTINUED] naproxen (NAPROSYN) 500 MG tablet Take 1 tablet (500 mg total) by mouth 2 (two) times daily as needed (neck pain). (Patient not taking: Reported on 6/18/2025) 20 tablet 0    [DISCONTINUED] olopatadine (PATADAY) 0.2 % Drop  Place 1 drop into both eyes once daily. 90 day supply 3 mL 1     No current facility-administered medications on file prior to visit.   [3]   Social History  Socioeconomic History    Marital status: Single   Tobacco Use    Smoking status: Never    Smokeless tobacco: Never     Social Drivers of Health     Financial Resource Strain: Low Risk  (4/22/2025)    Overall Financial Resource Strain (CARDIA)     Difficulty of Paying Living Expenses: Not hard at all   Food Insecurity: Patient Declined (4/22/2025)    Hunger Vital Sign     Worried About Running Out of Food in the Last Year: Patient declined     Ran Out of Food in the Last Year: Patient declined   Transportation Needs: Patient Declined (4/22/2025)    PRAPARE - Transportation     Lack of Transportation (Medical): Patient declined     Lack of Transportation (Non-Medical): Patient declined   Physical Activity: Insufficiently Active (4/22/2025)    Exercise Vital Sign     Days of Exercise per Week: 4 days     Minutes of Exercise per Session: 30 min   Stress: No Stress Concern Present (4/22/2025)    [employer] - Occupational Stress Questionnaire     Feeling of Stress : Only a little   Housing Stability: Patient Declined (4/22/2025)    Housing Stability Vital Sign     Unable to Pay for Housing in the Last Year: Patient declined     Number of Times Moved in the Last Year: 0     Homeless in the Last Year: Patient declined

## 2025-06-19 ENCOUNTER — RESULTS FOLLOW-UP (OUTPATIENT)
Dept: FAMILY MEDICINE | Facility: CLINIC | Age: 20
End: 2025-06-19
Payer: COMMERCIAL

## 2025-06-20 RX ORDER — FERROUS SULFATE 325(65) MG
325 TABLET ORAL 2 TIMES DAILY
Qty: 180 TABLET | Refills: 3 | Status: SHIPPED | OUTPATIENT
Start: 2025-06-20 | End: 2026-06-20

## 2025-07-24 ENCOUNTER — OFFICE VISIT (OUTPATIENT)
Dept: OBSTETRICS AND GYNECOLOGY | Facility: CLINIC | Age: 20
End: 2025-07-24
Payer: COMMERCIAL

## 2025-07-24 VITALS
BODY MASS INDEX: 37.86 KG/M2 | DIASTOLIC BLOOD PRESSURE: 80 MMHG | SYSTOLIC BLOOD PRESSURE: 138 MMHG | WEIGHT: 200.38 LBS

## 2025-07-24 DIAGNOSIS — Z01.419 ENCOUNTER FOR GYNECOLOGICAL EXAMINATION (GENERAL) (ROUTINE) WITHOUT ABNORMAL FINDINGS: Primary | ICD-10-CM

## 2025-07-24 DIAGNOSIS — Z12.4 CERVICAL CANCER SCREENING: ICD-10-CM

## 2025-07-24 DIAGNOSIS — Z00.00 ENCOUNTER FOR SCREENING AND PREVENTATIVE CARE: ICD-10-CM

## 2025-07-24 PROCEDURE — 3044F HG A1C LEVEL LT 7.0%: CPT | Mod: CPTII,S$GLB,, | Performed by: OBSTETRICS & GYNECOLOGY

## 2025-07-24 PROCEDURE — 1159F MED LIST DOCD IN RCRD: CPT | Mod: CPTII,S$GLB,, | Performed by: OBSTETRICS & GYNECOLOGY

## 2025-07-24 PROCEDURE — 3075F SYST BP GE 130 - 139MM HG: CPT | Mod: CPTII,S$GLB,, | Performed by: OBSTETRICS & GYNECOLOGY

## 2025-07-24 PROCEDURE — 99385 PREV VISIT NEW AGE 18-39: CPT | Mod: S$GLB,,, | Performed by: OBSTETRICS & GYNECOLOGY

## 2025-07-24 PROCEDURE — 3008F BODY MASS INDEX DOCD: CPT | Mod: CPTII,S$GLB,, | Performed by: OBSTETRICS & GYNECOLOGY

## 2025-07-24 PROCEDURE — 3079F DIAST BP 80-89 MM HG: CPT | Mod: CPTII,S$GLB,, | Performed by: OBSTETRICS & GYNECOLOGY

## 2025-07-24 PROCEDURE — 99999 PR PBB SHADOW E&M-EST. PATIENT-LVL III: CPT | Mod: PBBFAC,,, | Performed by: OBSTETRICS & GYNECOLOGY

## 2025-07-24 NOTE — PROGRESS NOTES
Chief Complaint   Patient presents with    Annual Exam    Well Woman       History and Physical:  Love Wells is a 20 y.o. F who presents today for her routine annual GYN exam. The patient has no Gynecology complaints.    Annual:      Patient's last menstrual period was 2025 (exact date).  Menarche: 11  Menstrual cycle: monthly, 4-5 days, normal flow, & some cramping.   Contraception: declined  Last Pap: none  History of abnormal pap: NA  STD testing: declined  Immunization status: up to date and documented. Status post HPV    Allergies:   Review of patient's allergies indicates:   Allergen Reactions    Imitrex [sumatriptan] Swelling     Throat swelling per pt     Past Medical History:   Diagnosis Date    Headache      History reviewed. No pertinent surgical history.  MEDS: Medications Ordered Prior to Encounter[1]  OB History          0    Para   0    Term   0       0    AB   0    Living   0         SAB   0    IAB   0    Ectopic   0    Multiple   0    Live Births   0               Social History[2]  Family History   Problem Relation Name Age of Onset    Breast cancer Paternal Grandmother      Heart disease Maternal Grandfather      Diabetes Father      Hypertension Father      Allergies Mother      Allergic rhinitis Mother      No Known Problems Sister      No Known Problems Sister      No Known Problems Sister      Angioedema Neg Hx      Asthma Neg Hx      Eczema Neg Hx      Immunodeficiency Neg Hx      Rhinitis Neg Hx      Urticaria Neg Hx         Past medical and surgical history reviewed.   I have reviewed the patient's medical history in detail and updated the computerized patient record.    Review of System:   General: no chills, fever, night sweats, weight gain or weight loss  Breasts: no new or changing breast lumps, nipple discharge or masses.  Gastrointestinal: no abdominal pain, change in bowel habits, or black or bloody stools  Genito-Urinary: no incontinence, urinary  frequency/urgency or vulvar/vaginal symptoms, pelvic pain or abnormal vaginal bleeding.    Physical Exam:   /80 (BP Location: Right arm, Patient Position: Sitting)   Wt 90.9 kg (200 lb 6.4 oz)   LMP 07/17/2025 (Exact Date)   BMI 37.86 kg/m²   Body mass index is 37.86 kg/m².  Constitutional: She appears alert and responsive. She appears well-developed, well-groomed, and well-nourished. No distress.  Abdominal: Soft. She exhibits no distension, hernias or masses. There is no tenderness. No enlargement of liver edge or spleen.  There is no rebound and no guarding.   Genitourinary:   Unable to complete exam due to patient discomort, pap not collected      Assessment/Plan:   Encounter for gynecological examination (general) (routine) without abnormal findings  -     Cancel: Liquid-Based Pap Smear, Screening    Encounter for screening and preventative care  -     Ambulatory referral/consult to Obstetrics / Gynecology    Cervical cancer screening  -     Cancel: Liquid-Based Pap Smear, Screening        Follow up in 1 year.         [1]   Current Outpatient Medications on File Prior to Visit   Medication Sig Dispense Refill    acetaminophen (TYLENOL) 500 MG tablet Take 500 mg by mouth every 6 (six) hours as needed for Pain.      atogepant (QULIPTA) 30 mg Tab Take 1 tablet by mouth once daily. 30 tablet 11    ferrous sulfate (IRON) 325 mg (65 mg iron) Tab tablet Take 1 tablet (325 mg total) by mouth 2 (two) times daily. 180 tablet 3    olopatadine (PATADAY) 0.2 % Drop Place 1 drop into both eyes once daily. 90 day supply 3 mL 1     No current facility-administered medications on file prior to visit.   [2]   Social History  Socioeconomic History    Marital status: Single   Tobacco Use    Smoking status: Never    Smokeless tobacco: Never   Substance and Sexual Activity    Alcohol use: Never    Drug use: Never    Sexual activity: Never     Birth control/protection: None     Social Drivers of Health     Financial Resource  Strain: Low Risk  (4/22/2025)    Overall Financial Resource Strain (CARDIA)     Difficulty of Paying Living Expenses: Not hard at all   Food Insecurity: Patient Declined (4/22/2025)    Hunger Vital Sign     Worried About Running Out of Food in the Last Year: Patient declined     Ran Out of Food in the Last Year: Patient declined   Transportation Needs: Patient Declined (4/22/2025)    PRAPARE - Transportation     Lack of Transportation (Medical): Patient declined     Lack of Transportation (Non-Medical): Patient declined   Physical Activity: Insufficiently Active (4/22/2025)    Exercise Vital Sign     Days of Exercise per Week: 4 days     Minutes of Exercise per Session: 30 min   Stress: No Stress Concern Present (4/22/2025)    [employer] - Occupational Stress Questionnaire     Feeling of Stress : Only a little   Housing Stability: Patient Declined (4/22/2025)    Housing Stability Vital Sign     Unable to Pay for Housing in the Last Year: Patient declined     Number of Times Moved in the Last Year: 0     Homeless in the Last Year: Patient declined

## 2025-08-21 ENCOUNTER — PATIENT MESSAGE (OUTPATIENT)
Dept: NEUROLOGY | Facility: CLINIC | Age: 20
End: 2025-08-21
Payer: COMMERCIAL